# Patient Record
Sex: MALE | Race: WHITE | NOT HISPANIC OR LATINO | URBAN - METROPOLITAN AREA
[De-identification: names, ages, dates, MRNs, and addresses within clinical notes are randomized per-mention and may not be internally consistent; named-entity substitution may affect disease eponyms.]

---

## 2024-03-11 ENCOUNTER — OV NP (OUTPATIENT)
Dept: URBAN - METROPOLITAN AREA CLINIC 2 | Facility: CLINIC | Age: 49
End: 2024-03-11
Payer: COMMERCIAL

## 2024-03-11 VITALS
WEIGHT: 174.6 LBS | DIASTOLIC BLOOD PRESSURE: 91 MMHG | SYSTOLIC BLOOD PRESSURE: 131 MMHG | TEMPERATURE: 98.3 F | BODY MASS INDEX: 23.14 KG/M2 | HEART RATE: 54 BPM | HEIGHT: 73 IN

## 2024-03-11 DIAGNOSIS — Z12.11 COLON CANCER SCREENING: ICD-10-CM

## 2024-03-11 DIAGNOSIS — L29.0 RECTAL ITCHING: ICD-10-CM

## 2024-03-11 DIAGNOSIS — K60.2 ANAL FISSURE: ICD-10-CM

## 2024-03-11 PROCEDURE — 99203 OFFICE O/P NEW LOW 30 MIN: CPT | Performed by: NURSE PRACTITIONER

## 2024-03-11 RX ORDER — HYDROCORTISONE 25 MG/G
1 APPLICATION OINTMENT TOPICAL ONCE A DAY
Status: ACTIVE | COMMUNITY

## 2024-03-11 RX ORDER — PITAVASTATIN CALCIUM 2.09 MG/1
1 TABLET TABLET, FILM COATED ORAL ONCE A DAY
Status: ACTIVE | COMMUNITY

## 2024-03-11 NOTE — PHYSICAL EXAM LYMPHATIC:
Neck , no lymphadenopathy Tired after eat, recently lost feeling in toes on both feet only feel last 2 toes. Dizziness, swimming, hard to focus,  Thumbs are semi numb, chronic knee and lower back. Aches near flank area. unable to hold down food for over a week now.  State diabetes runs on moms side of family.  Was DX as pre diabetes 3 yrs ago ran out of meds 1.5 yrs of weekly injectable-moved from [place].   Patient doesn't check blood sugars at home.  Patient state have lost 10-15 pounds in the last 1-2 wks.    Handheld blood sugar was administered today.

## 2024-03-11 NOTE — PHYSICAL EXAM GASTROINTESTINAL
Abdomen , soft, nontender, nondistended , no guarding or rigidity , no masses palpable , normal bowel sounds , Liver and Spleen , no hepatomegaly present , no hepatosplenomegaly , liver nontender , spleen not palpable, Rectal, anal fissure, anal erythema, no internal or external hemorrhoid. chaperone offered, but declined.

## 2024-03-11 NOTE — HPI-TODAY'S VISIT:
Very pleasant 48-year-old male seen today for complaints of rectal pain.  In February while traveling to [place] patient had issues with constipation.  At that time he did strain with a bowel movement and felt pain.  He tried over-the-counter hemorrhoid medications with no improvement.  He has also seen primary care and given steroid creams and suppositories.  He continues to have discomfort.  He also complains of rectal itching.  He has never had a colonoscopy.  There is no known family history of colon polyps or colon cancer.  He denies overt GI bleeding.  There is no unexplained weight loss.  Patient is very active.  He cycles and runs.

## 2024-04-23 ENCOUNTER — COLON (OUTPATIENT)
Dept: URBAN - METROPOLITAN AREA SURGERY CENTER 19 | Facility: SURGERY CENTER | Age: 49
End: 2024-04-23
Payer: COMMERCIAL

## 2024-04-23 DIAGNOSIS — Z12.11 ENCOUNTER FOR SCREENING FOR MALIGNANT NEOPLASM OF COLON: ICD-10-CM

## 2024-04-23 PROCEDURE — 45378 DIAGNOSTIC COLONOSCOPY: CPT | Performed by: INTERNAL MEDICINE

## 2024-04-23 RX ORDER — HYDROCORTISONE 25 MG/G
1 APPLICATION OINTMENT TOPICAL ONCE A DAY
Status: ACTIVE | COMMUNITY

## 2024-04-23 RX ORDER — PITAVASTATIN CALCIUM 2.09 MG/1
1 TABLET TABLET, FILM COATED ORAL ONCE A DAY
Status: ACTIVE | COMMUNITY

## 2024-05-07 ENCOUNTER — OFFICE VISIT (OUTPATIENT)
Dept: URBAN - METROPOLITAN AREA CLINIC 2 | Facility: CLINIC | Age: 49
End: 2024-05-07
Payer: COMMERCIAL

## 2024-05-07 ENCOUNTER — DASHBOARD ENCOUNTERS (OUTPATIENT)
Age: 49
End: 2024-05-07

## 2024-05-07 VITALS
WEIGHT: 177.6 LBS | SYSTOLIC BLOOD PRESSURE: 118 MMHG | DIASTOLIC BLOOD PRESSURE: 78 MMHG | TEMPERATURE: 97.7 F | BODY MASS INDEX: 23.54 KG/M2 | HEIGHT: 73 IN | HEART RATE: 50 BPM

## 2024-05-07 DIAGNOSIS — Z12.11 COLON CANCER SCREENING: ICD-10-CM

## 2024-05-07 DIAGNOSIS — L29.0 RECTAL ITCHING: ICD-10-CM

## 2024-05-07 PROBLEM — 305058001: Status: ACTIVE | Noted: 2024-05-07

## 2024-05-07 PROCEDURE — 99213 OFFICE O/P EST LOW 20 MIN: CPT | Performed by: NURSE PRACTITIONER

## 2024-05-07 RX ORDER — HYDROCORTISONE 25 MG/G
1 APPLICATION OINTMENT TOPICAL ONCE A DAY
Status: ACTIVE | COMMUNITY

## 2024-05-07 RX ORDER — PITAVASTATIN CALCIUM 2.09 MG/1
1 TABLET TABLET, FILM COATED ORAL ONCE A DAY
Status: ACTIVE | COMMUNITY

## 2024-05-07 RX ORDER — MICONAZOLE NITRATE 2 G/100G
1 APPLICATION CREAM TOPICAL TWICE A DAY
Qty: 30 GRAM | Refills: 0 | OUTPATIENT
Start: 2024-05-07 | End: 2024-05-21